# Patient Record
Sex: MALE | Race: OTHER | Employment: UNEMPLOYED | URBAN - METROPOLITAN AREA
[De-identification: names, ages, dates, MRNs, and addresses within clinical notes are randomized per-mention and may not be internally consistent; named-entity substitution may affect disease eponyms.]

---

## 2024-06-12 ENCOUNTER — APPOINTMENT (OUTPATIENT)
Dept: CT IMAGING | Age: 34
End: 2024-06-12

## 2024-06-12 ENCOUNTER — HOSPITAL ENCOUNTER (EMERGENCY)
Age: 34
Discharge: HOME | End: 2024-06-12

## 2024-06-12 VITALS
SYSTOLIC BLOOD PRESSURE: 142 MMHG | HEART RATE: 61 BPM | RESPIRATION RATE: 14 BRPM | OXYGEN SATURATION: 98 % | TEMPERATURE: 98.7 F | DIASTOLIC BLOOD PRESSURE: 78 MMHG

## 2024-06-12 DIAGNOSIS — R11.2 NAUSEA AND VOMITING, UNSPECIFIED VOMITING TYPE: Primary | ICD-10-CM

## 2024-06-12 LAB
ALBUMIN SERPL-MCNC: 4.6 G/DL (ref 3.5–5)
ALBUMIN/GLOB SERPL: 1.5 (ref 1–1.9)
ALP SERPL-CCNC: 119 U/L (ref 40–129)
ALT SERPL-CCNC: 13 U/L (ref 12–65)
ANION GAP SERPL CALC-SCNC: 12 MMOL/L (ref 9–18)
AST SERPL-CCNC: 15 U/L (ref 15–37)
BASOPHILS # BLD: 0.1 K/UL (ref 0–0.2)
BASOPHILS NFR BLD: 1 % (ref 0–2)
BILIRUB SERPL-MCNC: 0.7 MG/DL (ref 0–1.2)
BILIRUB UR QL: NEGATIVE
BUN SERPL-MCNC: 19 MG/DL (ref 6–23)
CALCIUM SERPL-MCNC: 10.3 MG/DL (ref 8.8–10.2)
CHLORIDE SERPL-SCNC: 94 MMOL/L (ref 98–107)
CO2 SERPL-SCNC: 29 MMOL/L (ref 20–28)
CREAT SERPL-MCNC: 0.86 MG/DL (ref 0.8–1.3)
DIFFERENTIAL METHOD BLD: ABNORMAL
EOSINOPHIL # BLD: 0 K/UL (ref 0–0.8)
EOSINOPHIL NFR BLD: 0 % (ref 0.5–7.8)
ERYTHROCYTE [DISTWIDTH] IN BLOOD BY AUTOMATED COUNT: 11.9 % (ref 11.9–14.6)
GLOBULIN SER CALC-MCNC: 3.1 G/DL (ref 2.3–3.5)
GLUCOSE SERPL-MCNC: 111 MG/DL (ref 70–99)
GLUCOSE UR QL STRIP.AUTO: NEGATIVE MG/DL
HCT VFR BLD AUTO: 48.2 % (ref 41.1–50.3)
HGB BLD-MCNC: 16.8 G/DL (ref 13.6–17.2)
IMM GRANULOCYTES # BLD AUTO: 0.1 K/UL (ref 0–0.5)
IMM GRANULOCYTES NFR BLD AUTO: 1 % (ref 0–5)
KETONES UR-MCNC: ABNORMAL MG/DL
LEUKOCYTE ESTERASE UR QL STRIP: NEGATIVE
LIPASE SERPL-CCNC: 33 U/L (ref 13–60)
LYMPHOCYTES # BLD: 3.7 K/UL (ref 0.5–4.6)
LYMPHOCYTES NFR BLD: 35 % (ref 13–44)
MCH RBC QN AUTO: 31.5 PG (ref 26.1–32.9)
MCHC RBC AUTO-ENTMCNC: 34.9 G/DL (ref 31.4–35)
MCV RBC AUTO: 90.4 FL (ref 82–102)
MONOCYTES # BLD: 0.6 K/UL (ref 0.1–1.3)
MONOCYTES NFR BLD: 6 % (ref 4–12)
NEUTS SEG # BLD: 6.2 K/UL (ref 1.7–8.2)
NEUTS SEG NFR BLD: 57 % (ref 43–78)
NITRITE UR QL: NEGATIVE
NRBC # BLD: 0 K/UL (ref 0–0.2)
PLATELET # BLD AUTO: 287 K/UL (ref 150–450)
PMV BLD AUTO: 9 FL (ref 9.4–12.3)
POTASSIUM SERPL-SCNC: 4.7 MMOL/L (ref 3.5–5.1)
PROT SERPL-MCNC: 7.7 G/DL (ref 6.3–8.2)
PROT UR QL: NEGATIVE MG/DL
RBC # BLD AUTO: 5.33 M/UL (ref 4.23–5.6)
RBC # UR STRIP: ABNORMAL
SERVICE CMNT-IMP: ABNORMAL
SODIUM SERPL-SCNC: 134 MMOL/L (ref 136–145)
SP GR UR: 1.01 (ref 1–1.02)
UROBILINOGEN UR QL: >=8 EU/DL (ref 0.2–1)
WBC # BLD AUTO: 10.7 K/UL (ref 4.3–11.1)

## 2024-06-12 PROCEDURE — 81003 URINALYSIS AUTO W/O SCOPE: CPT

## 2024-06-12 PROCEDURE — 74177 CT ABD & PELVIS W/CONTRAST: CPT

## 2024-06-12 PROCEDURE — 2580000003 HC RX 258: Performed by: PHYSICIAN ASSISTANT

## 2024-06-12 PROCEDURE — 96374 THER/PROPH/DIAG INJ IV PUSH: CPT

## 2024-06-12 PROCEDURE — 85025 COMPLETE CBC W/AUTO DIFF WBC: CPT

## 2024-06-12 PROCEDURE — 80053 COMPREHEN METABOLIC PANEL: CPT

## 2024-06-12 PROCEDURE — 6360000004 HC RX CONTRAST MEDICATION: Performed by: PHYSICIAN ASSISTANT

## 2024-06-12 PROCEDURE — 6360000002 HC RX W HCPCS: Performed by: PHYSICIAN ASSISTANT

## 2024-06-12 PROCEDURE — 99285 EMERGENCY DEPT VISIT HI MDM: CPT

## 2024-06-12 PROCEDURE — 83690 ASSAY OF LIPASE: CPT

## 2024-06-12 RX ORDER — DICYCLOMINE HYDROCHLORIDE 10 MG/1
10 CAPSULE ORAL
Qty: 120 CAPSULE | Refills: 0 | Status: SHIPPED | OUTPATIENT
Start: 2024-06-12

## 2024-06-12 RX ORDER — 0.9 % SODIUM CHLORIDE 0.9 %
1000 INTRAVENOUS SOLUTION INTRAVENOUS
Status: COMPLETED | OUTPATIENT
Start: 2024-06-12 | End: 2024-06-12

## 2024-06-12 RX ORDER — ONDANSETRON 2 MG/ML
4 INJECTION INTRAMUSCULAR; INTRAVENOUS
Status: COMPLETED | OUTPATIENT
Start: 2024-06-12 | End: 2024-06-12

## 2024-06-12 RX ORDER — ONDANSETRON 4 MG/1
4 TABLET, ORALLY DISINTEGRATING ORAL 3 TIMES DAILY PRN
Qty: 21 TABLET | Refills: 0 | Status: SHIPPED | OUTPATIENT
Start: 2024-06-12

## 2024-06-12 RX ADMIN — ONDANSETRON 4 MG: 2 INJECTION INTRAMUSCULAR; INTRAVENOUS at 15:56

## 2024-06-12 RX ADMIN — IOPAMIDOL 100 ML: 755 INJECTION, SOLUTION INTRAVENOUS at 17:07

## 2024-06-12 RX ADMIN — SODIUM CHLORIDE 1000 ML: 9 INJECTION, SOLUTION INTRAVENOUS at 14:48

## 2024-06-12 ASSESSMENT — PAIN SCALES - GENERAL: PAINLEVEL_OUTOF10: 0

## 2024-06-12 NOTE — ED PROVIDER NOTES
Emergency Department Provider Note       PCP: No primary care provider on file.   Age: 33 y.o.   Sex: male     DISPOSITION Decision To Discharge 06/12/2024 06:05:03 PM       ICD-10-CM    1. Nausea and vomiting, unspecified vomiting type  R11.2 [facility]        Medical Decision Making     Patient is a 33-year-old male, Khmer-speaking, history taken with the assistance of .  Nausea and vomiting for a few days.  This is happening recurrently throughout his life.  Does not note any significant past medical history.  Labs are very reassuring here.  CT was obtained and does not demonstrate any acute abnormality.  Patient is feeling much better after fluids and Zofran.  Patient will be discharged home.     Complexity of Problem: 1 undiagnosed new problem with uncertain prognosis.  Over the counter drug management performed.  Patient was discharged risks and benefits of hospitalization were considered.  Shared medical decision making was utilized in creating the patients health plan today.    I independently ordered and reviewed each unique test.     I interpreted the CT Scan normal appendix.              Voice dictation software was used during the making of this note.  This software is not perfect and grammatical and other typographical errors may be present.  This note has not been completely proofread for errors.    History     Patient is a 33-year-old male who is brought in by family for complaint of nausea vomiting.  He states the symptoms occur every several months.  He states they typically occur when he discontinues marijuana use.  Family endorses that he was afflicted with \"a bacteria\" in his stomach however they are unsure what exactly given that those medical records were lost when the patient immigrated to the  from [facility].  He has upper abdominal pain.  There are no aggravating or alleviating factors.  He has no chest pain or shortness of breath.

## 2024-06-12 NOTE — ED TRIAGE NOTES
Arrived to lobby via POV, C/o N/V, Abd pain x 6 days, can't eat/hold down food,  chills, Denies diarrhea/cough/congestion    PMH: Asthma

## 2024-06-12 NOTE — ED NOTES
Patient mobility status  with no difficulty. Provider aware     I have reviewed discharge instructions with the spouse.  The patient and spouse verbalized understanding.    Patient left ED via Discharge Method: ambulatory to Home with Spouse.    Opportunity for questions and clarification provided.     Patient given 2 scripts.              Sybil Medel, RN  06/12/24 [id number]

## 2024-06-12 NOTE — DISCHARGE INSTRUCTIONS
Oconnor examen de hoy es muy tranquilizador.  Todos konrad análisis de laboratorio parecen normales y oconnor tomografía computarizada no reveló ningún proceso emergente o que ponga en peligro oconnor sherry.  Si tus síntomas persisten es necesario que te atienda un gastroenterólogo.  Enviaré aneta referencia en oconnor nombre.  Mientras tanto, tome estos nuevos medicamentos abraham robina se los recetaron para aliviar konrad síntomas.  Regrese aquí si presenta síntomas que empeoran o son preocupantes.